# Patient Record
Sex: FEMALE | Race: WHITE | NOT HISPANIC OR LATINO
[De-identification: names, ages, dates, MRNs, and addresses within clinical notes are randomized per-mention and may not be internally consistent; named-entity substitution may affect disease eponyms.]

---

## 2022-04-07 PROBLEM — Z00.00 ENCOUNTER FOR PREVENTIVE HEALTH EXAMINATION: Status: ACTIVE | Noted: 2022-04-07

## 2022-05-10 ENCOUNTER — APPOINTMENT (OUTPATIENT)
Dept: ORTHOPEDIC SURGERY | Facility: CLINIC | Age: 82
End: 2022-05-10
Payer: COMMERCIAL

## 2022-05-10 VITALS — HEIGHT: 65 IN | BODY MASS INDEX: 35.32 KG/M2 | WEIGHT: 212 LBS

## 2022-05-10 DIAGNOSIS — Z78.9 OTHER SPECIFIED HEALTH STATUS: ICD-10-CM

## 2022-05-10 DIAGNOSIS — Z86.79 PERSONAL HISTORY OF OTHER DISEASES OF THE CIRCULATORY SYSTEM: ICD-10-CM

## 2022-05-10 DIAGNOSIS — Z86.39 PERSONAL HISTORY OF OTHER ENDOCRINE, NUTRITIONAL AND METABOLIC DISEASE: ICD-10-CM

## 2022-05-10 PROCEDURE — 99214 OFFICE O/P EST MOD 30 MIN: CPT

## 2022-05-10 RX ORDER — FUROSEMIDE 20 MG/1
20 TABLET ORAL
Refills: 0 | Status: ACTIVE | COMMUNITY

## 2022-05-10 RX ORDER — METOPROLOL SUCCINATE 200 MG/1
TABLET, EXTENDED RELEASE ORAL
Refills: 0 | Status: ACTIVE | COMMUNITY

## 2022-05-10 RX ORDER — RIVAROXABAN 20 MG/1
20 TABLET, FILM COATED ORAL
Refills: 0 | Status: ACTIVE | COMMUNITY

## 2022-05-10 RX ORDER — ROSUVASTATIN CALCIUM 20 MG/1
20 TABLET, FILM COATED ORAL
Refills: 0 | Status: ACTIVE | COMMUNITY

## 2022-05-10 NOTE — PHYSICAL EXAM
[Bilateral] : knee bilaterally [NL (0)] : extension 0 degrees [] : ambulation with cane [de-identified] : PVD, mild pitting edema both legs [TWNoteComboBox7] : flexion 135 degrees

## 2022-05-10 NOTE — HISTORY OF PRESENT ILLNESS
[4] : 4 [Dull/Aching] : dull/aching [Intermittent] : intermittent [Leisure] : leisure [Physical therapy] : physical therapy [Stairs] : stairs [Retired] : Work status: retired [] : Post Surgical Visit: no [FreeTextEntry1] : B Knees  [de-identified] : 3/8/2022 [de-identified] : Dr. Angelo

## 2022-05-10 NOTE — REASON FOR VISIT
[Spouse] : spouse [FreeTextEntry2] : Patient complains of continued soreness R Knee. Improvement L Knee . Patient would like to repeat HA injections both knees. Goes to PT.

## 2022-05-31 ENCOUNTER — APPOINTMENT (OUTPATIENT)
Dept: ORTHOPEDIC SURGERY | Facility: CLINIC | Age: 82
End: 2022-05-31

## 2022-06-14 ENCOUNTER — APPOINTMENT (OUTPATIENT)
Dept: ORTHOPEDIC SURGERY | Facility: CLINIC | Age: 82
End: 2022-06-14
Payer: COMMERCIAL

## 2022-06-14 VITALS — WEIGHT: 212 LBS | HEIGHT: 65 IN | BODY MASS INDEX: 35.32 KG/M2

## 2022-06-14 PROCEDURE — 20611 DRAIN/INJ JOINT/BURSA W/US: CPT | Mod: 50

## 2022-06-14 RX ORDER — HYALURONATE SODIUM 10 MG/ML
20 VIAL (ML) INTRAARTICULAR
Refills: 0 | Status: COMPLETED | OUTPATIENT
Start: 2022-06-14

## 2022-06-14 RX ORDER — LOSARTAN POTASSIUM 50 MG/1
50 TABLET, FILM COATED ORAL
Qty: 90 | Refills: 0 | Status: ACTIVE | COMMUNITY
Start: 2022-04-11

## 2022-06-14 RX ORDER — HYALURONATE SODIUM 10 MG/ML
20 VIAL (ML) INTRAARTICULAR
Qty: 20 | Refills: 0 | Status: ACTIVE | COMMUNITY
Start: 2022-05-17

## 2022-06-14 NOTE — PHYSICAL EXAM
[Bilateral] : knee bilaterally [NL (0)] : extension 0 degrees [] : ambulation with cane [de-identified] : PVD, mild pitting edema both legs [TWNoteComboBox7] : flexion 135 degrees

## 2022-06-14 NOTE — PROCEDURE
[Large Joint Injection] : Large joint injection [Bilateral] : bilaterally of the [Knee] : knee [Pain] : pain [Inflammation] : inflammation [X-ray evidence of Osteoarthritis on this or prior visit] : x-ray evidence of Osteoarthritis on this or prior visit [Repeat series performed] : repeat series performed [Betadine] : betadine [Ethyl Chloride sprayed topically] : ethyl chloride sprayed topically [Sterile technique used] : sterile technique used [Other: ____] : [unfilled] [#1] : series #1 [] : Patient tolerated procedure well [Apply ice for 15min out of every hour for the next 12-24 hours as tolerated] : apply ice for 15 minutes out of every hour for the next 12-24 hours as tolerated [Patient was advised to rest the joint(s) for ____ days] : patient was advised to rest the joint(s) for [unfilled] days [Risks, benefits, alternatives discussed / Verbal consent obtained] : the risks benefits, and alternatives have been discussed, and verbal consent was obtained [Prior failure or difficult injection] : prior failure or difficult injection [All ultrasound images have been permanently captured and stored accordingly in our picture archiving and communication system] : All ultrasound images have been permanently captured and stored accordingly in our picture archiving and communication system [Visualization of the needle and placement of injection was performed without complication] : visualization of the needle and placement of injection was performed without complication

## 2022-06-14 NOTE — ASSESSMENT
[FreeTextEntry1] : recommend rest and apply ice to the knee today\par gave her a note to get [employer] for both legs for lymphedema.

## 2022-06-14 NOTE — HISTORY OF PRESENT ILLNESS
[Gradual] : gradual [4] : 4 [Intermittent] : intermittent [Household chores] : household chores [Leisure] : leisure [Rest] : rest [Injection therapy] : injection therapy [Stairs] : stairs [Retired] : Work status: retired [] : Post Surgical Visit: no

## 2022-06-21 ENCOUNTER — APPOINTMENT (OUTPATIENT)
Dept: ORTHOPEDIC SURGERY | Facility: CLINIC | Age: 82
End: 2022-06-21
Payer: COMMERCIAL

## 2022-06-21 VITALS — WEIGHT: 212 LBS | HEIGHT: 65 IN | BODY MASS INDEX: 35.32 KG/M2

## 2022-06-21 PROCEDURE — 20611 DRAIN/INJ JOINT/BURSA W/US: CPT | Mod: 50

## 2022-06-21 RX ORDER — HYALURONATE SODIUM 10 MG/ML
20 VIAL (ML) INTRAARTICULAR
Refills: 0 | Status: COMPLETED | OUTPATIENT
Start: 2022-06-21

## 2022-06-21 NOTE — PROCEDURE
[Large Joint Injection] : Large joint injection [Bilateral] : bilaterally of the [Knee] : knee [Pain] : pain [X-ray evidence of Osteoarthritis on this or prior visit] : x-ray evidence of Osteoarthritis on this or prior visit [Repeat series performed] : repeat series performed [Betadine] : betadine [Ethyl Chloride sprayed topically] : ethyl chloride sprayed topically [Sterile technique used] : sterile technique used [Other: ____] : [unfilled] [#2] : series #2 [] : Patient tolerated procedure well [Apply ice for 15min out of every hour for the next 12-24 hours as tolerated] : apply ice for 15 minutes out of every hour for the next 12-24 hours as tolerated [Patient was advised to rest the joint(s) for ____ days] : patient was advised to rest the joint(s) for [unfilled] days [Risks, benefits, alternatives discussed / Verbal consent obtained] : the risks benefits, and alternatives have been discussed, and verbal consent was obtained [Prior failure or difficult injection] : prior failure or difficult injection [All ultrasound images have been permanently captured and stored accordingly in our picture archiving and communication system] : All ultrasound images have been permanently captured and stored accordingly in our picture archiving and communication system [Visualization of the needle and placement of injection was performed without complication] : visualization of the needle and placement of injection was performed without complication

## 2022-06-21 NOTE — HISTORY OF PRESENT ILLNESS
[Gradual] : gradual [3] : 3 [Dull/Aching] : dull/aching [Intermittent] : intermittent [Household chores] : household chores [Leisure] : leisure [Rest] : rest [Injection therapy] : injection therapy [Stairs] : stairs [Retired] : Work status: retired [2] : 2 [Hyalgan] : Hyalgan [] : Post Surgical Visit: no [de-identified] : 6/14/2022

## 2022-06-21 NOTE — PHYSICAL EXAM
[Bilateral] : knee bilaterally [NL (0)] : extension 0 degrees [] : ambulation with cane [de-identified] : PVD, mild pitting edema both legs [TWNoteComboBox7] : flexion 135 degrees

## 2022-06-28 ENCOUNTER — APPOINTMENT (OUTPATIENT)
Dept: ORTHOPEDIC SURGERY | Facility: CLINIC | Age: 82
End: 2022-06-28
Payer: COMMERCIAL

## 2022-06-28 VITALS — HEIGHT: 65 IN | WEIGHT: 212 LBS | BODY MASS INDEX: 35.32 KG/M2

## 2022-06-28 PROCEDURE — 20611 DRAIN/INJ JOINT/BURSA W/US: CPT | Mod: 50

## 2022-06-28 RX ORDER — HYALURONATE SODIUM 10 MG/ML
20 VIAL (ML) INTRAARTICULAR
Refills: 0 | Status: COMPLETED | OUTPATIENT
Start: 2022-06-28

## 2022-06-28 NOTE — PROCEDURE
[Large Joint Injection] : Large joint injection [Bilateral] : bilaterally of the [Knee] : knee [Pain] : pain [Inflammation] : inflammation [X-ray evidence of Osteoarthritis on this or prior visit] : x-ray evidence of Osteoarthritis on this or prior visit [Repeat series performed] : repeat series performed [Betadine] : betadine [Ethyl Chloride sprayed topically] : ethyl chloride sprayed topically [Sterile technique used] : sterile technique used [Other: ____] : [unfilled] [#3] : series #3 [Apply ice for 15min out of every hour for the next 12-24 hours as tolerated] : apply ice for 15 minutes out of every hour for the next 12-24 hours as tolerated [Patient was advised to rest the joint(s) for ____ days] : patient was advised to rest the joint(s) for [unfilled] days [Prior failure or difficult injection] : prior failure or difficult injection [All ultrasound images have been permanently captured and stored accordingly in our picture archiving and communication system] : All ultrasound images have been permanently captured and stored accordingly in our picture archiving and communication system [Visualization of the needle and placement of injection was performed without complication] : visualization of the needle and placement of injection was performed without complication

## 2022-06-28 NOTE — PHYSICAL EXAM
[Bilateral] : knee bilaterally [NL (0)] : extension 0 degrees [] : ambulation with cane [de-identified] : PVD, mild pitting edema both legs [TWNoteComboBox7] : flexion 135 degrees

## 2022-06-28 NOTE — HISTORY OF PRESENT ILLNESS
[Gradual] : gradual [4] : 4 [Dull/Aching] : dull/aching [Intermittent] : intermittent [Leisure] : leisure [Rest] : rest [Injection therapy] : injection therapy [Walking] : walking [Stairs] : stairs [Retired] : Work status: retired [3] : 3 [Hyalgan] : Hyalgan [] : Post Surgical Visit: no [de-identified] : 6/21/2022

## 2022-07-05 ENCOUNTER — APPOINTMENT (OUTPATIENT)
Dept: ORTHOPEDIC SURGERY | Facility: CLINIC | Age: 82
End: 2022-07-05

## 2022-07-05 VITALS — HEIGHT: 65 IN | WEIGHT: 212 LBS | BODY MASS INDEX: 35.32 KG/M2

## 2022-07-05 PROCEDURE — 20611 DRAIN/INJ JOINT/BURSA W/US: CPT | Mod: 50

## 2022-07-05 RX ORDER — HYALURONATE SODIUM 10 MG/ML
20 VIAL (ML) INTRAARTICULAR
Refills: 0 | Status: COMPLETED | OUTPATIENT
Start: 2022-07-05

## 2022-07-05 NOTE — PROCEDURE
[Large Joint Injection] : Large joint injection [Bilateral] : bilaterally of the [Knee] : knee [Pain] : pain [X-ray evidence of Osteoarthritis on this or prior visit] : x-ray evidence of Osteoarthritis on this or prior visit [Repeat series performed] : repeat series performed [Betadine] : betadine [Ethyl Chloride sprayed topically] : ethyl chloride sprayed topically [Sterile technique used] : sterile technique used [Other: ____] : [unfilled] [#4] : series #4 [Apply ice for 15min out of every hour for the next 12-24 hours as tolerated] : apply ice for 15 minutes out of every hour for the next 12-24 hours as tolerated [Patient was advised to rest the joint(s) for ____ days] : patient was advised to rest the joint(s) for [unfilled] days [Risks, benefits, alternatives discussed / Verbal consent obtained] : the risks benefits, and alternatives have been discussed, and verbal consent was obtained [Prior failure or difficult injection] : prior failure or difficult injection [All ultrasound images have been permanently captured and stored accordingly in our picture archiving and communication system] : All ultrasound images have been permanently captured and stored accordingly in our picture archiving and communication system [Visualization of the needle and placement of injection was performed without complication] : visualization of the needle and placement of injection was performed without complication

## 2022-07-06 NOTE — PHYSICAL EXAM
[Bilateral] : knee bilaterally [NL (0)] : extension 0 degrees [] : ambulation with cane [de-identified] : PVD, mild pitting edema both legs [TWNoteComboBox7] : flexion 135 degrees

## 2022-07-06 NOTE — HISTORY OF PRESENT ILLNESS
[Dull/Aching] : dull/aching [Intermittent] : intermittent [Household chores] : household chores [Leisure] : leisure [Rest] : rest [Injection therapy] : injection therapy [Stairs] : stairs [Retired] : Work status: retired [4] : 4 [Hyalgan] : Hyalgan [] : Post Surgical Visit: no [de-identified] : 6/28/2022

## 2022-07-12 ENCOUNTER — APPOINTMENT (OUTPATIENT)
Dept: ORTHOPEDIC SURGERY | Facility: CLINIC | Age: 82
End: 2022-07-12

## 2022-07-12 VITALS — WEIGHT: 212 LBS | HEIGHT: 65 IN | BODY MASS INDEX: 35.32 KG/M2

## 2022-07-12 PROCEDURE — 20611 DRAIN/INJ JOINT/BURSA W/US: CPT | Mod: 50

## 2022-07-12 RX ORDER — HYALURONATE SODIUM 10 MG/ML
20 VIAL (ML) INTRAARTICULAR
Refills: 0 | Status: COMPLETED | OUTPATIENT
Start: 2022-07-12

## 2022-07-12 NOTE — PROCEDURE
[Large Joint Injection] : Large joint injection [Bilateral] : bilaterally of the [Knee] : knee [Pain] : pain [X-ray evidence of Osteoarthritis on this or prior visit] : x-ray evidence of Osteoarthritis on this or prior visit [Repeat series performed] : repeat series performed [Betadine] : betadine [Ethyl Chloride sprayed topically] : ethyl chloride sprayed topically [Sterile technique used] : sterile technique used [Other: ____] : [unfilled] [#5] : series #5 [] : Patient tolerated procedure well [Apply ice for 15min out of every hour for the next 12-24 hours as tolerated] : apply ice for 15 minutes out of every hour for the next 12-24 hours as tolerated [Patient was advised to rest the joint(s) for ____ days] : patient was advised to rest the joint(s) for [unfilled] days [Risks, benefits, alternatives discussed / Verbal consent obtained] : the risks benefits, and alternatives have been discussed, and verbal consent was obtained [Prior failure or difficult injection] : prior failure or difficult injection [All ultrasound images have been permanently captured and stored accordingly in our picture archiving and communication system] : All ultrasound images have been permanently captured and stored accordingly in our picture archiving and communication system [Visualization of the needle and placement of injection was performed without complication] : visualization of the needle and placement of injection was performed without complication

## 2022-07-12 NOTE — HISTORY OF PRESENT ILLNESS
[Gradual] : gradual [Dull/Aching] : dull/aching [Intermittent] : intermittent [Rest] : rest [Stairs] : stairs [5] : 5 [Hyalgan] : Hyalgan [] : Post Surgical Visit: no [de-identified] : 7/5/2022

## 2022-07-12 NOTE — PHYSICAL EXAM
[Bilateral] : knee bilaterally [NL (0)] : extension 0 degrees [] : ambulation with cane [de-identified] : PVD, mild pitting edema both legs [TWNoteComboBox7] : flexion 135 degrees

## 2022-08-22 ENCOUNTER — APPOINTMENT (OUTPATIENT)
Dept: ORTHOPEDIC SURGERY | Facility: CLINIC | Age: 82
End: 2022-08-22

## 2022-08-22 VITALS — BODY MASS INDEX: 35.32 KG/M2 | HEIGHT: 65 IN | WEIGHT: 212 LBS

## 2022-08-22 PROCEDURE — 73502 X-RAY EXAM HIP UNI 2-3 VIEWS: CPT | Mod: LT

## 2022-08-22 PROCEDURE — 20610 DRAIN/INJ JOINT/BURSA W/O US: CPT | Mod: LT

## 2022-08-22 PROCEDURE — 99213 OFFICE O/P EST LOW 20 MIN: CPT | Mod: 25

## 2022-08-22 RX ORDER — METHYLPREDNISOLONE ACETATE 40 MG/ML
40 INJECTION, SUSPENSION INTRA-ARTICULAR; INTRALESIONAL; INTRAMUSCULAR; SOFT TISSUE
Qty: 1 | Refills: 0 | Status: COMPLETED | OUTPATIENT
Start: 2022-08-22

## 2022-08-22 NOTE — PHYSICAL EXAM
[Left] : left hip [] : ambulation with crutches [AP] : anteroposterior [Lateral] : lateral [There are no fractures, subluxations or dislocations. No significant abnormalities are seen] : There are no fractures, subluxations or dislocations. No significant abnormalities are seen [Moderate arthritis (Tonnis Grade 2)] : Moderate arthritis (Tonnis Grade 2)

## 2022-08-22 NOTE — HISTORY OF PRESENT ILLNESS
[Gradual] : gradual [8] : 8 [2] : 2 [Dull/Aching] : dull/aching [Sharp] : sharp [Tightness] : tightness [Frequent] : frequent [Household chores] : household chores [Leisure] : leisure [Sleep] : sleep [Rest] : rest [Ice] : ice [Heat] : heat [Sitting] : sitting [Standing] : standing [Walking] : walking [Retired] : Work status: retired [de-identified] : Left hip pain [] : no [FreeTextEntry1] : left hip

## 2022-08-22 NOTE — PROCEDURE
[Large Joint Injection] : Large joint injection [Left] : of the left [Greater Trochanteric Bursa] : greater trochanteric bursa [Pain] : pain [Betadine] : betadine [Ethyl Chloride sprayed topically] : ethyl chloride sprayed topically [Sterile technique used] : sterile technique used [___ cc    1%] : Lidocaine ~Vcc of 1%  [___ cc    40mg] : Methylprednisolone (Depomedrol) ~Vcc of 40 mg  [] : Patient tolerated procedure well [Apply ice for 15min out of every hour for the next 12-24 hours as tolerated] : apply ice for 15 minutes out of every hour for the next 12-24 hours as tolerated [Patient was advised to rest the joint(s) for ____ days] : patient was advised to rest the joint(s) for [unfilled] days [Risks, benefits, alternatives discussed / Verbal consent obtained] : the risks benefits, and alternatives have been discussed, and verbal consent was obtained

## 2022-08-22 NOTE — REASON FOR VISIT
[Spouse] : spouse [FreeTextEntry2] : Patient c/o left hip pain for about one week. No injury or trauma. Has sharp pain that radiates down leg to the knee.Applied ice and heat.\par Difficulty with sleeping. Is using a cane and a crutch, cannot take NSAIDs. Has defib/ PM and on anticoagulants.

## 2022-09-12 ENCOUNTER — APPOINTMENT (OUTPATIENT)
Dept: ORTHOPEDIC SURGERY | Facility: CLINIC | Age: 82
End: 2022-09-12

## 2022-09-12 VITALS — HEIGHT: 65 IN | BODY MASS INDEX: 35.32 KG/M2 | WEIGHT: 212 LBS

## 2022-09-12 PROCEDURE — 99213 OFFICE O/P EST LOW 20 MIN: CPT

## 2022-09-12 NOTE — REASON FOR VISIT
[FreeTextEntry2] : Patient complains of continued stiffness and discomfort in the R Knee. Patient feels improvement in the L Knee. Patient states that R Knee discomfort increases with walking. Patent has been taking Tylenol which has been helpful. Uses a crutch and cane. History of lymphedema in both legs, treated.

## 2022-09-12 NOTE — HISTORY OF PRESENT ILLNESS
[Gradual] : gradual [8] : 8 [1] : 2 [Dull/Aching] : dull/aching [Intermittent] : intermittent [Leisure] : leisure [Rest] : rest [Meds] : meds [Walking] : walking [Retired] : Work status: retired [] : Post Surgical Visit: no [FreeTextEntry7] : R Calf [FreeTextEntry9] : Tylenol [de-identified] : 07/12/22 [de-identified] : Dr. Angelo [de-identified] : 07/12/22

## 2022-09-12 NOTE — PHYSICAL EXAM
[NL (0)] : extension 0 degrees [Bilateral] : foot and ankle bilaterally [] : swelling [Mild] : mild swelling of calf [de-identified] : PVD, mild pitting edema both legs [TWNoteComboBox7] : flexion 135 degrees

## 2022-09-16 ENCOUNTER — NON-APPOINTMENT (OUTPATIENT)
Age: 82
End: 2022-09-16

## 2022-10-19 ENCOUNTER — APPOINTMENT (OUTPATIENT)
Dept: ORTHOPEDIC SURGERY | Facility: CLINIC | Age: 82
End: 2022-10-19

## 2022-10-19 VITALS — BODY MASS INDEX: 35.32 KG/M2 | WEIGHT: 212 LBS | HEIGHT: 65 IN

## 2022-10-19 PROCEDURE — 99214 OFFICE O/P EST MOD 30 MIN: CPT

## 2022-10-19 NOTE — ASSESSMENT
[FreeTextEntry1] : describes radicular pain left leg. Will get CT scan of the lumbar spine, she has a pacemaker. May try HEP and voltaren gel.

## 2022-10-19 NOTE — PHYSICAL EXAM
[Left] : left hip [Bilateral] : knee bilaterally [NL (0)] : extension 0 degrees [] : antalgic [de-identified] : PVD, mild pitting edema both legs [TWNoteComboBox7] : flexion 135 degrees no fever and no chills.

## 2022-10-19 NOTE — HISTORY OF PRESENT ILLNESS
[Gradual] : gradual [3] : 3 [Dull/Aching] : dull/aching [Intermittent] : intermittent [Rest] : rest [Physical therapy] : physical therapy [Retired] : Work status: retired [] : Post Surgical Visit: no [de-identified] : 09/12/22 [de-identified] : Dr. Angelo [de-identified] : 10/11/22

## 2022-10-19 NOTE — REASON FOR VISIT
[Spouse] : spouse [FreeTextEntry2] : Patient feels some improvement in Both Knees since her last visit. Patient states that she goes to PT 1x a week which has been helpful. Patient states that she has been having some swelling in Both Legs which recently increased. Patient also has some L Hip pain which she would like to discuss. Has been doing some exercises at home. On Xarelto.

## 2022-10-24 ENCOUNTER — RESULT REVIEW (OUTPATIENT)
Age: 82
End: 2022-10-24

## 2022-11-07 ENCOUNTER — APPOINTMENT (OUTPATIENT)
Dept: ORTHOPEDIC SURGERY | Facility: CLINIC | Age: 82
End: 2022-11-07

## 2022-11-07 VITALS — WEIGHT: 212 LBS | HEIGHT: 65 IN | BODY MASS INDEX: 35.32 KG/M2

## 2022-11-07 PROCEDURE — 99214 OFFICE O/P EST MOD 30 MIN: CPT

## 2022-11-07 NOTE — HISTORY OF PRESENT ILLNESS
[Gradual] : gradual [5] : 5 [Dull/Aching] : dull/aching [Intermittent] : intermittent [Stairs] : stairs [Retired] : Work status: retired [] : Post Surgical Visit: no [de-identified] : CT scan

## 2022-11-07 NOTE — REASON FOR VISIT
[FreeTextEntry2] : Patient complains of continued L Hip pain since her last visit. Patient states that she started taking Tramadol (prescribed by her cardiologist) which has helped a little bit. Patient states that she went to [email or web address] in [place] and had her legs wrapped which has helped decrease swelling. Patient has been doing Home Exercises which has been helpful. Patient would like to discuss CT scan report: multiple DDD, stenosis and spondylolisthesis. Getting cardioversion tomorrow.

## 2022-12-23 ENCOUNTER — APPOINTMENT (OUTPATIENT)
Dept: ORTHOPEDIC SURGERY | Facility: CLINIC | Age: 82
End: 2022-12-23

## 2022-12-23 VITALS — BODY MASS INDEX: 35.32 KG/M2 | WEIGHT: 212 LBS | HEIGHT: 65 IN

## 2022-12-23 PROCEDURE — 99213 OFFICE O/P EST LOW 20 MIN: CPT

## 2022-12-23 NOTE — ASSESSMENT
[FreeTextEntry1] : while in exam room, phone call to her cardiologist and permission given for MDP as she is on Xarelto.

## 2022-12-23 NOTE — HISTORY OF PRESENT ILLNESS
[Lower back] : lower back [Left Leg] : left leg [Gradual] : gradual [9] : 9 [1] : 2 [Radiating] : radiating [Sharp] : sharp [Intermittent] : intermittent [Household chores] : household chores [Leisure] : leisure [Rest] : rest [Stairs] : stairs [Retired] : Work status: retired [] : Post Surgical Visit: no [FreeTextEntry7] : L Leg  [de-identified] : 11/7/2022 [de-identified] : Dr. Angelo

## 2022-12-23 NOTE — REASON FOR VISIT
[Spouse] : spouse [Family Member] : family member [FreeTextEntry2] : Patient complains of continued  L Back pain radiating down L Leg. Accompanied by spouse and daughter Courtney. Sitting in wheelchair today due to inability to walk long distances.

## 2022-12-28 ENCOUNTER — NON-APPOINTMENT (OUTPATIENT)
Age: 82
End: 2022-12-28

## 2023-01-10 ENCOUNTER — APPOINTMENT (OUTPATIENT)
Dept: ORTHOPEDIC SURGERY | Facility: CLINIC | Age: 83
End: 2023-01-10
Payer: COMMERCIAL

## 2023-01-10 VITALS — WEIGHT: 212 LBS | BODY MASS INDEX: 35.32 KG/M2 | HEIGHT: 65 IN

## 2023-01-10 PROCEDURE — 99213 OFFICE O/P EST LOW 20 MIN: CPT

## 2023-01-10 RX ORDER — METHYLPREDNISOLONE 4 MG/1
4 TABLET ORAL
Qty: 21 | Refills: 0 | Status: COMPLETED | COMMUNITY
Start: 2022-12-23 | End: 2023-01-10

## 2023-01-10 NOTE — HISTORY OF PRESENT ILLNESS
[Lower back] : lower back [Left Leg] : left leg [Gradual] : gradual [8] : 8 [0] : 0 [Radiating] : radiating [Sharp] : sharp [Intermittent] : intermittent [Leisure] : leisure [Rest] : rest [Walking] : walking [Stairs] : stairs [Retired] : Work status: retired [] : Post Surgical Visit: no [FreeTextEntry7] : L Leg  [de-identified] : 12/23/2022 [de-identified] : Dr. Angelo

## 2023-01-10 NOTE — REASON FOR VISIT
[Spouse] : spouse [FreeTextEntry2] : Patient complains of continued L Back pain. MDP only gave her a few days relief. Difficulty with stairs.Pain radiating down L Leg. Accompanied by spouse

## 2023-01-10 NOTE — PHYSICAL EXAM
[Left lower extremity below knee] : left lower extremity below knee [] : negative sitting straight leg raise [FreeTextEntry3] : sitting in wheelchair, difficult to get up.  [de-identified] : severe lymphedema both legs

## 2023-01-10 NOTE — ASSESSMENT
[FreeTextEntry1] : unable to take NSAIDs due to cardiac condition. Will try PT, if no better discussed PM for injections. Wants o try another MDP, will check with her cardiologist prior to taking it.

## 2023-01-24 ENCOUNTER — APPOINTMENT (OUTPATIENT)
Dept: ORTHOPEDIC SURGERY | Facility: CLINIC | Age: 83
End: 2023-01-24
Payer: COMMERCIAL

## 2023-01-24 VITALS — WEIGHT: 212 LBS | BODY MASS INDEX: 35.32 KG/M2 | HEIGHT: 65 IN

## 2023-01-24 PROCEDURE — 99213 OFFICE O/P EST LOW 20 MIN: CPT

## 2023-01-24 RX ORDER — METHYLPREDNISOLONE 4 MG/1
4 TABLET ORAL
Qty: 21 | Refills: 0 | Status: COMPLETED | COMMUNITY
Start: 2023-01-10 | End: 2023-01-24

## 2023-01-24 NOTE — HISTORY OF PRESENT ILLNESS
[Lower back] : lower back [Gradual] : gradual [7] : 7 Abdominal Pain, N/V/D [3] : 3 [Dull/Aching] : dull/aching [Radiating] : radiating [Intermittent] : intermittent [Household chores] : household chores [Rest] : rest [Meds] : meds [Physical therapy] : physical therapy [Walking] : walking [Retired] : Work status: retired [] : Post Surgical Visit: no [FreeTextEntry7] : L Leg  [de-identified] : 1/10/2023 [de-identified] : Dr. Angelo  [de-identified] : 1/21/2023 [de-identified] : CT

## 2023-01-24 NOTE — PHYSICAL EXAM
[] : negative sitting straight leg raise [de-identified] : lymphedema both legs. Able to heel and toe walk.

## 2023-01-24 NOTE — ASSESSMENT
[FreeTextEntry1] : feeling better with the MDP; will continue with PT. Wants to hold on PM for now.

## 2023-01-24 NOTE — REASON FOR VISIT
[Spouse] : spouse [FreeTextEntry2] : Patient was feeling improvement after Medrol Dose igor. Patient has also started PT which has been helping. Decreased pain L Back. She has not started pain management yet. Accompanied by spouse

## 2023-03-07 ENCOUNTER — APPOINTMENT (OUTPATIENT)
Dept: ORTHOPEDIC SURGERY | Facility: CLINIC | Age: 83
End: 2023-03-07
Payer: COMMERCIAL

## 2023-03-07 VITALS — BODY MASS INDEX: 35.32 KG/M2 | HEIGHT: 65 IN | WEIGHT: 212 LBS

## 2023-03-07 DIAGNOSIS — M70.62 TROCHANTERIC BURSITIS, LEFT HIP: ICD-10-CM

## 2023-03-07 PROCEDURE — 99214 OFFICE O/P EST MOD 30 MIN: CPT

## 2023-03-07 NOTE — REASON FOR VISIT
[Spouse] : spouse [FreeTextEntry2] : Patient feels some improvement since last visit. She continues with PT twice a week which has been helpful. Accompanied by spouse.

## 2023-03-07 NOTE — HISTORY OF PRESENT ILLNESS
[Lower back] : lower back [Left Leg] : left leg [Gradual] : gradual [5] : 5 [Dull/Aching] : dull/aching [Intermittent] : intermittent [Household chores] : household chores [Leisure] : leisure [Physical therapy] : physical therapy [Retired] : Work status: retired [] : Post Surgical Visit: no [FreeTextEntry7] : L Leg  [de-identified] : 1/24/2023 [de-identified] : Dr. Angelo  [de-identified] : 2/2/2023 [de-identified] : CT L SPINE

## 2023-03-07 NOTE — PHYSICAL EXAM
[] : negative sitting straight leg raise [de-identified] : lymphedema both legs. Able to heel and toe walk.

## 2023-04-18 ENCOUNTER — APPOINTMENT (OUTPATIENT)
Dept: ORTHOPEDIC SURGERY | Facility: CLINIC | Age: 83
End: 2023-04-18
Payer: COMMERCIAL

## 2023-04-18 VITALS — BODY MASS INDEX: 35.32 KG/M2 | WEIGHT: 212 LBS | HEIGHT: 65 IN

## 2023-04-18 PROCEDURE — 99213 OFFICE O/P EST LOW 20 MIN: CPT

## 2023-04-18 NOTE — PHYSICAL EXAM
[de-identified] : lymphedema both legs. Able to heel and toe walk.  [Bilateral] : knee bilaterally [NL (0)] : extension 0 degrees Ejection Fraction(%) [] : ambulation with cane [de-identified] : PVD, mild pitting edema both legs [TWNoteComboBox7] : flexion 135 degrees

## 2023-04-18 NOTE — REASON FOR VISIT
[Spouse] : spouse [FreeTextEntry2] : Patient feels improvement in her Lower Back since her last visit. Patient has been going to PT 2x a week which has been helpful. Some radiating pain in the R Leg.

## 2023-04-18 NOTE — HISTORY OF PRESENT ILLNESS
[Lower back] : lower back [Gradual] : gradual [1] : 2 [Dull/Aching] : dull/aching [Intermittent] : intermittent [Retired] : Work status: retired [] : Post Surgical Visit: no [FreeTextEntry7] : R Leg [de-identified] : 03/07/23 [de-identified] : DR. Angelo [de-identified] : 04/13/23

## 2023-05-30 ENCOUNTER — APPOINTMENT (OUTPATIENT)
Dept: ORTHOPEDIC SURGERY | Facility: CLINIC | Age: 83
End: 2023-05-30
Payer: COMMERCIAL

## 2023-05-30 VITALS — HEIGHT: 65 IN | WEIGHT: 212 LBS | BODY MASS INDEX: 35.32 KG/M2

## 2023-05-30 PROCEDURE — 99213 OFFICE O/P EST LOW 20 MIN: CPT

## 2023-05-30 RX ORDER — TRAMADOL HYDROCHLORIDE 50 MG/1
50 TABLET, COATED ORAL
Qty: 15 | Refills: 0 | Status: COMPLETED | COMMUNITY
Start: 2022-11-04 | End: 2023-05-30

## 2023-05-30 RX ORDER — SOTALOL HYDROCHLORIDE 80 MG/1
80 TABLET ORAL
Refills: 0 | Status: COMPLETED | COMMUNITY
End: 2023-05-30

## 2023-05-30 RX ORDER — AMIODARONE HYDROCHLORIDE 200 MG/1
200 TABLET ORAL
Qty: 100 | Refills: 0 | Status: ACTIVE | COMMUNITY
Start: 2023-05-03

## 2023-05-30 NOTE — HISTORY OF PRESENT ILLNESS
[Lower back] : lower back [Gradual] : gradual [4] : 4 [Dull/Aching] : dull/aching [Intermittent] : intermittent [Leisure] : leisure [Walking] : walking [Retired] : Work status: retired [] : Post Surgical Visit: no [de-identified] : 4/18/2023 [de-identified] : Dr. Angelo

## 2023-05-30 NOTE — PHYSICAL EXAM
[de-identified] : lymphedema both legs. Able to heel and toe walk.  [Bilateral] : knee bilaterally [NL (0)] : extension 0 degrees [] : ambulation with cane [de-identified] : PVD, mild pitting edema both legs [TWNoteComboBox7] : flexion 135 degrees

## 2023-05-30 NOTE — REASON FOR VISIT
[Spouse] : spouse [FreeTextEntry2] : Patient feels improvement since last visit. Decreased pain L Back. Patient would like a new prescription for PT Accompanied by spouse

## 2023-07-12 ENCOUNTER — APPOINTMENT (OUTPATIENT)
Dept: ORTHOPEDIC SURGERY | Facility: CLINIC | Age: 83
End: 2023-07-12
Payer: COMMERCIAL

## 2023-07-12 VITALS — WEIGHT: 212 LBS | BODY MASS INDEX: 35.32 KG/M2 | HEIGHT: 65 IN

## 2023-07-12 PROCEDURE — 99213 OFFICE O/P EST LOW 20 MIN: CPT

## 2023-07-12 RX ORDER — PANTOPRAZOLE 40 MG/1
40 TABLET, DELAYED RELEASE ORAL
Qty: 45 | Refills: 0 | Status: COMPLETED | COMMUNITY
Start: 2023-05-03 | End: 2023-07-12

## 2023-07-12 NOTE — PHYSICAL EXAM
[Bilateral] : knee bilaterally [NL (0)] : extension 0 degrees [] : ambulation with cane [de-identified] : PVD, mild pitting edema both legs [TWNoteComboBox7] : flexion 135 degrees

## 2023-07-12 NOTE — REASON FOR VISIT
[Spouse] : spouse [FreeTextEntry2] : Patient feels improvement since last. Decreased pain L Leg. PT has been helpful.She would like a new prescription for PT. She would also like to know if its possible to get a prescription for an scooter.

## 2023-07-12 NOTE — HISTORY OF PRESENT ILLNESS
[Lower back] : lower back [Right Leg] : right leg [Gradual] : gradual [4] : 4 [0] : 0 [Radiating] : radiating [Intermittent] : intermittent [Leisure] : leisure [Rest] : rest [Physical therapy] : physical therapy [Walking] : walking [Retired] : Work status: retired [] : Post Surgical Visit: no [FreeTextEntry7] : R Leg  [de-identified] : 5/30/2023 [de-identified] : Dr. Angelo  [de-identified] : M

## 2023-10-18 ENCOUNTER — APPOINTMENT (OUTPATIENT)
Dept: ORTHOPEDIC SURGERY | Facility: CLINIC | Age: 83
End: 2023-10-18
Payer: COMMERCIAL

## 2023-10-18 DIAGNOSIS — M79.89 OTHER SPECIFIED SOFT TISSUE DISORDERS: ICD-10-CM

## 2023-10-18 PROCEDURE — 99214 OFFICE O/P EST MOD 30 MIN: CPT | Mod: 25

## 2023-10-18 PROCEDURE — 20610 DRAIN/INJ JOINT/BURSA W/O US: CPT | Mod: LT

## 2023-10-18 RX ORDER — METHYLPREDNISOLONE ACETATE 40 MG/ML
40 INJECTION, SUSPENSION INTRA-ARTICULAR; INTRALESIONAL; INTRAMUSCULAR; SOFT TISSUE
Qty: 1 | Refills: 0 | Status: COMPLETED | OUTPATIENT
Start: 2023-10-18

## 2023-10-25 ENCOUNTER — APPOINTMENT (OUTPATIENT)
Dept: ORTHOPEDIC SURGERY | Facility: CLINIC | Age: 83
End: 2023-10-25

## 2023-11-21 ENCOUNTER — APPOINTMENT (OUTPATIENT)
Dept: ORTHOPEDIC SURGERY | Facility: CLINIC | Age: 83
End: 2023-11-21
Payer: COMMERCIAL

## 2023-11-21 VITALS — BODY MASS INDEX: 35.32 KG/M2 | WEIGHT: 212 LBS | HEIGHT: 65 IN

## 2023-11-21 PROCEDURE — 20611 DRAIN/INJ JOINT/BURSA W/US: CPT | Mod: 50

## 2023-11-21 RX ORDER — HYALURONATE SODIUM 10 MG/ML
20 VIAL (ML) INTRAARTICULAR
Refills: 0 | Status: COMPLETED | OUTPATIENT
Start: 2023-11-21

## 2023-12-01 ENCOUNTER — APPOINTMENT (OUTPATIENT)
Dept: ORTHOPEDIC SURGERY | Facility: CLINIC | Age: 83
End: 2023-12-01

## 2023-12-12 ENCOUNTER — APPOINTMENT (OUTPATIENT)
Dept: ORTHOPEDIC SURGERY | Facility: CLINIC | Age: 83
End: 2023-12-12
Payer: COMMERCIAL

## 2023-12-12 VITALS — HEIGHT: 65 IN | BODY MASS INDEX: 35.32 KG/M2 | WEIGHT: 212 LBS

## 2023-12-12 PROCEDURE — 20611 DRAIN/INJ JOINT/BURSA W/US: CPT | Mod: 50

## 2023-12-12 RX ORDER — HYALURONATE SODIUM 10 MG/ML
20 VIAL (ML) INTRAARTICULAR
Refills: 0 | Status: COMPLETED | OUTPATIENT
Start: 2023-12-12

## 2023-12-19 ENCOUNTER — APPOINTMENT (OUTPATIENT)
Dept: ORTHOPEDIC SURGERY | Facility: CLINIC | Age: 83
End: 2023-12-19
Payer: COMMERCIAL

## 2023-12-19 VITALS — BODY MASS INDEX: 35.32 KG/M2 | WEIGHT: 212 LBS | HEIGHT: 65 IN

## 2023-12-19 PROCEDURE — 20611 DRAIN/INJ JOINT/BURSA W/US: CPT | Mod: 50

## 2023-12-19 RX ORDER — HYALURONATE SODIUM 10 MG/ML
20 VIAL (ML) INTRAARTICULAR
Refills: 0 | Status: COMPLETED | OUTPATIENT
Start: 2023-12-19

## 2023-12-19 NOTE — PHYSICAL EXAM
[Bilateral] : knee bilaterally [NL (0)] : extension 0 degrees [] : uses walker [de-identified] : PVD, mild pitting edema both legs [TWNoteComboBox7] : flexion 120 degrees

## 2023-12-19 NOTE — HISTORY OF PRESENT ILLNESS
[Gradual] : gradual [4] : 4 [Intermittent] : intermittent [Leisure] : leisure [Rest] : rest [Walking] : walking [Stairs] : stairs [Retired] : Work status: retired [3] : 3 [Hyalgan] : Hyalgan [] : Post Surgical Visit: no [FreeTextEntry1] : B Knees  [de-identified] : 12/12/2023

## 2023-12-19 NOTE — PROCEDURE
[Large Joint Injection] : Large joint injection [Bilateral] : bilaterally of the [Knee] : knee [Pain] : pain [X-ray evidence of Osteoarthritis on this or prior visit] : x-ray evidence of Osteoarthritis on this or prior visit [Betadine] : betadine [Ethyl Chloride sprayed topically] : ethyl chloride sprayed topically [Sterile technique used] : sterile technique used [Hyalgan (20mg)] : 20mg of Hyalgan [#3] : series #3 [] : Patient tolerated procedure well [Apply ice for 15min out of every hour for the next 12-24 hours as tolerated] : apply ice for 15 minutes out of every hour for the next 12-24 hours as tolerated [Patient was advised to rest the joint(s) for ____ days] : patient was advised to rest the joint(s) for [unfilled] days [Risks, benefits, alternatives discussed / Verbal consent obtained] : the risks benefits, and alternatives have been discussed, and verbal consent was obtained [Prior failure or difficult injection] : prior failure or difficult injection [All ultrasound images have been permanently captured and stored accordingly in our picture archiving and communication system] : All ultrasound images have been permanently captured and stored accordingly in our picture archiving and communication system [Visualization of the needle and placement of injection was performed without complication] : visualization of the needle and placement of injection was performed without complication

## 2023-12-27 ENCOUNTER — APPOINTMENT (OUTPATIENT)
Dept: ORTHOPEDIC SURGERY | Facility: CLINIC | Age: 83
End: 2023-12-27
Payer: COMMERCIAL

## 2023-12-27 VITALS — HEIGHT: 65 IN | BODY MASS INDEX: 35.32 KG/M2 | WEIGHT: 212 LBS

## 2023-12-27 DIAGNOSIS — M25.552 PAIN IN LEFT HIP: ICD-10-CM

## 2023-12-27 PROCEDURE — 20611 DRAIN/INJ JOINT/BURSA W/US: CPT | Mod: 50

## 2023-12-27 RX ORDER — HYALURONATE SODIUM 10 MG/ML
20 VIAL (ML) INTRAARTICULAR
Refills: 0 | Status: COMPLETED | OUTPATIENT
Start: 2023-12-27

## 2023-12-27 NOTE — PROCEDURE
[Large Joint Injection] : Large joint injection [Bilateral] : bilaterally of the [Knee] : knee [X-ray evidence of Osteoarthritis on this or prior visit] : x-ray evidence of Osteoarthritis on this or prior visit [Repeat series performed] : repeat series performed [Betadine] : betadine [Ethyl Chloride sprayed topically] : ethyl chloride sprayed topically [Sterile technique used] : sterile technique used [Hyalgan (20mg)] : 20mg of Hyalgan [#4] : series #4 [] : Patient tolerated procedure well [Apply ice for 15min out of every hour for the next 12-24 hours as tolerated] : apply ice for 15 minutes out of every hour for the next 12-24 hours as tolerated [Patient was advised to rest the joint(s) for ____ days] : patient was advised to rest the joint(s) for [unfilled] days [Risks, benefits, alternatives discussed / Verbal consent obtained] : the risks benefits, and alternatives have been discussed, and verbal consent was obtained [Prior failure or difficult injection] : prior failure or difficult injection [All ultrasound images have been permanently captured and stored accordingly in our picture archiving and communication system] : All ultrasound images have been permanently captured and stored accordingly in our picture archiving and communication system [Visualization of the needle and placement of injection was performed without complication] : visualization of the needle and placement of injection was performed without complication

## 2023-12-27 NOTE — PHYSICAL EXAM
[Bilateral] : knee bilaterally [NL (0)] : extension 0 degrees [] : uses walker [de-identified] : PVD, mild pitting edema both legs [TWNoteComboBox7] : flexion 120 degrees

## 2023-12-27 NOTE — HISTORY OF PRESENT ILLNESS
[Gradual] : gradual [Dull/Aching] : dull/aching [Intermittent] : intermittent [Leisure] : leisure [Rest] : rest [Walking] : walking [Stairs] : stairs [Retired] : Work status: retired [4] : 4 [Hyalgan] : Hyalgan [] : Post Surgical Visit: no [FreeTextEntry1] : B Knees  [de-identified] : 12/19/2023

## 2024-01-03 ENCOUNTER — APPOINTMENT (OUTPATIENT)
Dept: ORTHOPEDIC SURGERY | Facility: CLINIC | Age: 84
End: 2024-01-03
Payer: COMMERCIAL

## 2024-01-03 DIAGNOSIS — M17.11 UNILATERAL PRIMARY OSTEOARTHRITIS, RIGHT KNEE: ICD-10-CM

## 2024-01-03 DIAGNOSIS — M17.12 UNILATERAL PRIMARY OSTEOARTHRITIS, LEFT KNEE: ICD-10-CM

## 2024-01-03 PROCEDURE — 20611 DRAIN/INJ JOINT/BURSA W/US: CPT | Mod: 50

## 2024-01-03 RX ORDER — HYALURONATE SODIUM 10 MG/ML
20 VIAL (ML) INTRAARTICULAR
Refills: 0 | Status: COMPLETED | OUTPATIENT
Start: 2024-01-03

## 2024-01-03 NOTE — HISTORY OF PRESENT ILLNESS
[Gradual] : gradual [3] : 3 [Dull/Aching] : dull/aching [Intermittent] : intermittent [Rest] : rest [Hyalgan] : Hyalgan [] : Post Surgical Visit: no [de-identified] : 12/27/23

## 2024-01-03 NOTE — PROCEDURE
[Large Joint Injection] : Large joint injection [Bilateral] : bilaterally of the [Knee] : knee [Pain] : pain [X-ray evidence of Osteoarthritis on this or prior visit] : x-ray evidence of Osteoarthritis on this or prior visit [Repeat series performed] : repeat series performed [Betadine] : betadine [Ethyl Chloride sprayed topically] : ethyl chloride sprayed topically [Sterile technique used] : sterile technique used [Hyalgan (20mg)] : 20mg of Hyalgan [#5] : series #5 [] : Patient tolerated procedure well [Apply ice for 15min out of every hour for the next 12-24 hours as tolerated] : apply ice for 15 minutes out of every hour for the next 12-24 hours as tolerated [Patient was advised to rest the joint(s) for ____ days] : patient was advised to rest the joint(s) for [unfilled] days [Risks, benefits, alternatives discussed / Verbal consent obtained] : the risks benefits, and alternatives have been discussed, and verbal consent was obtained [Prior failure or difficult injection] : prior failure or difficult injection [All ultrasound images have been permanently captured and stored accordingly in our picture archiving and communication system] : All ultrasound images have been permanently captured and stored accordingly in our picture archiving and communication system [Visualization of the needle and placement of injection was performed without complication] : visualization of the needle and placement of injection was performed without complication

## 2024-01-03 NOTE — PHYSICAL EXAM
[Bilateral] : knee bilaterally [NL (0)] : extension 0 degrees [] : uses walker [de-identified] : PVD, mild pitting edema both legs [TWNoteComboBox7] : flexion 120 degrees

## 2024-04-01 ENCOUNTER — APPOINTMENT (OUTPATIENT)
Dept: ORTHOPEDIC SURGERY | Facility: CLINIC | Age: 84
End: 2024-04-01
Payer: COMMERCIAL

## 2024-04-01 VITALS — HEIGHT: 65 IN | WEIGHT: 212 LBS | BODY MASS INDEX: 35.32 KG/M2

## 2024-04-01 PROCEDURE — 72100 X-RAY EXAM L-S SPINE 2/3 VWS: CPT

## 2024-04-01 PROCEDURE — 72170 X-RAY EXAM OF PELVIS: CPT

## 2024-04-01 PROCEDURE — 99213 OFFICE O/P EST LOW 20 MIN: CPT

## 2024-04-01 NOTE — PHYSICAL EXAM
[] : midline lumbar tenderness [Facet arthropathy] : Facet arthropathy [Straightening consistent with spasm] : Straightening consistent with spasm [Disc space narrowing] : Disc space narrowing [AP] : anteroposterior [There are no fractures, subluxations or dislocations. No significant abnormalities are seen] : There are no fractures, subluxations or dislocations. No significant abnormalities are seen [Moderate arthritis (Tonnis Grade 2)] : Moderate arthritis (Tonnis Grade 2) [FreeTextEntry9] : sitting on the scooter, limite exam [FreeTextEntry1] : chronic compression fractures L3, L4.

## 2024-04-01 NOTE — HISTORY OF PRESENT ILLNESS
[Gradual] : gradual [Lower back] : lower back [8] : 8 [Sharp] : sharp [Constant] : constant [Leisure] : leisure [Rest] : rest [Sleep] : sleep [Standing] : standing [Walking] : walking [Retired] : Work status: retired [] : no [FreeTextEntry7] : B Legs  [de-identified] : 10/18/2022 [de-identified] : Dr. Angelo

## 2024-04-01 NOTE — REASON FOR VISIT
[Family Member] : family member [FreeTextEntry2] : Patient complains of continued sharp Lower Back pain. She complains of leg cramping which wakes her. Is now using a motorized scooter to get around.

## 2024-04-01 NOTE — ASSESSMENT
[FreeTextEntry1] : has defibrillator, so will get CT scan. PT prescribed. May need to refer to PM, await CT results.

## 2024-04-08 ENCOUNTER — RESULT REVIEW (OUTPATIENT)
Age: 84
End: 2024-04-08

## 2024-04-14 ENCOUNTER — NON-APPOINTMENT (OUTPATIENT)
Age: 84
End: 2024-04-14

## 2024-04-26 ENCOUNTER — APPOINTMENT (OUTPATIENT)
Dept: ORTHOPEDIC SURGERY | Facility: CLINIC | Age: 84
End: 2024-04-26
Payer: COMMERCIAL

## 2024-04-26 VITALS — HEIGHT: 65 IN | WEIGHT: 212 LBS | BODY MASS INDEX: 35.32 KG/M2

## 2024-04-26 DIAGNOSIS — M48.061 SPINAL STENOSIS, LUMBAR REGION WITHOUT NEUROGENIC CLAUDICATION: ICD-10-CM

## 2024-04-26 DIAGNOSIS — M54.16 RADICULOPATHY, LUMBAR REGION: ICD-10-CM

## 2024-04-26 DIAGNOSIS — S32.030S WEDGE COMPRESSION FRACTURE OF THIRD LUMBAR VERTEBRA, SEQUELA: ICD-10-CM

## 2024-04-26 PROCEDURE — 99214 OFFICE O/P EST MOD 30 MIN: CPT

## 2024-04-26 NOTE — ASSESSMENT
[FreeTextEntry1] : 84 F with LBP and neurogenic claudication.  Pain more likely from stenosis given improvement with forward flexion MRI L spine Likely referral to pain management No NSAIDS to A/c

## 2024-04-26 NOTE — IMAGING
[de-identified] : Spine: Inspection/Palpation: No tenderness to palpation throughout Cervical/thoracic/lumbar spine. No bony stepoffs, No lesions.  Gait: antalgic,   e: Flexion to 90 degrees, Extension to 30 degrees, rotation 30 degrees bilaterally, lateral flexion to 30 degrees bilaterally.  Neurologic:  Bilateral Lower Extremities 5/5 Iliopsoas/Quadriceps/Hamstrings/ Tibialis Anterior/ Gastrocnemius. Extensor Hallucis Longus/ Flexor Hallucis Longus except    Sensation intact to light touch L2-S1  Patellar/ Achilles reflex within normal limits.  CT L spine Severe biconcaved compression fracture of L3 and moderate compression deformity of the superior L4 endplate are age-indeterminate on CT however are new since the previous examination. MRI of the lumbar spine with better assess age of the fractures if clinically warranted.  Prominent degenerative disc disease and facet arthrosis throughout the lumbar spine.  At L2-L3 there is moderate to severe central canal and moderate foraminal stenosis. Findings are more pronounced than on the prior exam. ----- Page Break ----- At L3-L4 there is severe central canal and moderate to severe foraminal stenosis. Findings are more pronounced than on the prior study.  At L4-L5 there is moderate foraminal stenosis.

## 2024-04-26 NOTE — HISTORY OF PRESENT ILLNESS
[Lower back] : lower back [Dull/Aching] : dull/aching [Radiating] : radiating [Sharp] : sharp [de-identified] : 04/26/2024: Patient presents today for a new consult visit for the L-spine. Patient states that she has been having pain for a couple years. Was seeing Dr. Angelo who did xrays/mri. Patient is ambulating in electric wheelchair.  Has possible VCF unsure of acuity. recent CT scna. Pain worst in back and goes down legs when standing or walking.  Can only walk for a short distance before needs to sit down. Pain improved when leaning forward.  Has MRI Conditional AICD  [] : Post Surgical Visit: no

## 2024-05-06 ENCOUNTER — APPOINTMENT (OUTPATIENT)
Dept: ORTHOPEDIC SURGERY | Facility: CLINIC | Age: 84
End: 2024-05-06

## 2024-06-14 ENCOUNTER — APPOINTMENT (OUTPATIENT)
Dept: ORTHOPEDIC SURGERY | Facility: CLINIC | Age: 84
End: 2024-06-14
Payer: COMMERCIAL

## 2024-06-14 VITALS — WEIGHT: 212 LBS | HEIGHT: 65 IN | BODY MASS INDEX: 35.32 KG/M2

## 2024-06-14 DIAGNOSIS — S32.040S WEDGE COMPRESSION FRACTURE OF FOURTH LUMBAR VERTEBRA, SEQUELA: ICD-10-CM

## 2024-06-14 PROCEDURE — 99213 OFFICE O/P EST LOW 20 MIN: CPT

## 2024-06-14 NOTE — DATA REVIEWED
[MRI] : MRI [Lumbar Spine] : lumbar spine [I independently reviewed and interpreted images and report] : I independently reviewed and interpreted images and report [FreeTextEntry1] : acute L4 and subacute L3 VCF

## 2024-06-14 NOTE — IMAGING
[de-identified] : Spine: Inspection/Palpation: No tenderness to palpation throughout Cervical/thoracic/lumbar spine. No bony stepoffs, No lesions.  Gait: antalgic,   e: Flexion to 90 degrees, Extension to 30 degrees, rotation 30 degrees bilaterally, lateral flexion to 30 degrees bilaterally.  Neurologic:  Bilateral Lower Extremities 5/5 Iliopsoas/Quadriceps/Hamstrings/ Tibialis Anterior/ Gastrocnemius. Extensor Hallucis Longus/ Flexor Hallucis Longus except    Sensation intact to light touch L2-S1  Patellar/ Achilles reflex within normal limits.  CT L spine Severe biconcaved compression fracture of L3 and moderate compression deformity of the superior L4 endplate are age-indeterminate on CT however are new since the previous examination. MRI of the lumbar spine with better assess age of the fractures if clinically warranted.  Prominent degenerative disc disease and facet arthrosis throughout the lumbar spine.  At L2-L3 there is moderate to severe central canal and moderate foraminal stenosis. Findings are more pronounced than on the prior exam. ----- Page Break ----- At L3-L4 there is severe central canal and moderate to severe foraminal stenosis. Findings are more pronounced than on the prior study.  At L4-L5 there is moderate foraminal stenosis.

## 2024-06-14 NOTE — ASSESSMENT
[FreeTextEntry1] : 84 F with LBP and neurogenic claudication.  Pain more likely from stenosis given improvement with forward flexion subacute L3 and acute L4 VCF LSO Consider pain management given her pain seems to improve with forward bending so less likely from her fractures .

## 2024-06-14 NOTE — HISTORY OF PRESENT ILLNESS
[Lower back] : lower back [Dull/Aching] : dull/aching [Radiating] : radiating [Sharp] : sharp [Retired] : Work status: retired [de-identified] : 6/14/24 Pain has improved but persist.  Pain worsens when sitting. Improved when leaning forward   04/26/2024: Patient presents today for a new consult visit for the L-spine. Patient states that she has been having pain for a couple years. Was seeing Dr. Angelo who did xrays/mri. Patient is ambulating in electric wheelchair.  Has possible VCF unsure of acuity. recent CT scna. Pain worst in back and goes down legs when standing or walking.  Can only walk for a short distance before needs to sit down. Pain improved when leaning forward.  Has MRI Conditional AICD  [] : Post Surgical Visit: no [de-identified] : 4/26/2024 [de-identified] : Dr. Hodge [de-identified] : MRI

## 2024-07-01 ENCOUNTER — NON-APPOINTMENT (OUTPATIENT)
Age: 84
End: 2024-07-01

## 2024-08-23 ENCOUNTER — APPOINTMENT (OUTPATIENT)
Dept: ORTHOPEDIC SURGERY | Facility: CLINIC | Age: 84
End: 2024-08-23

## 2025-07-09 RX ORDER — HYALURONATE SODIUM 10 MG/ML
20 VIAL (ML) INTRAARTICULAR
Qty: 10 | Refills: 0 | Status: ACTIVE | COMMUNITY
Start: 2025-07-09 | End: 1900-01-01

## 2025-07-21 ENCOUNTER — APPOINTMENT (OUTPATIENT)
Dept: ORTHOPEDIC SURGERY | Facility: CLINIC | Age: 85
End: 2025-07-21
Payer: COMMERCIAL

## 2025-07-21 PROCEDURE — 73562 X-RAY EXAM OF KNEE 3: CPT | Mod: 50

## 2025-07-21 PROCEDURE — 99213 OFFICE O/P EST LOW 20 MIN: CPT | Mod: 25

## 2025-07-21 PROCEDURE — 20611 DRAIN/INJ JOINT/BURSA W/US: CPT | Mod: 50

## 2025-07-21 RX ORDER — HYALURONATE SODIUM 10 MG/ML
20 VIAL (ML) INTRAARTICULAR
Refills: 0 | Status: COMPLETED | OUTPATIENT
Start: 2025-07-21

## 2025-07-29 ENCOUNTER — APPOINTMENT (OUTPATIENT)
Dept: ORTHOPEDIC SURGERY | Facility: CLINIC | Age: 85
End: 2025-07-29
Payer: COMMERCIAL

## 2025-07-29 VITALS — BODY MASS INDEX: 35.32 KG/M2 | HEIGHT: 65 IN | WEIGHT: 212 LBS

## 2025-07-29 VITALS — HEIGHT: 65 IN | BODY MASS INDEX: 35.32 KG/M2 | WEIGHT: 212 LBS

## 2025-07-29 PROCEDURE — 20611 DRAIN/INJ JOINT/BURSA W/US: CPT | Mod: 50

## 2025-07-29 RX ORDER — HYALURONATE SODIUM 10 MG/ML
20 VIAL (ML) INTRAARTICULAR
Refills: 0 | Status: COMPLETED | OUTPATIENT
Start: 2025-07-29

## 2025-08-04 ENCOUNTER — APPOINTMENT (OUTPATIENT)
Dept: ORTHOPEDIC SURGERY | Facility: CLINIC | Age: 85
End: 2025-08-04
Payer: COMMERCIAL

## 2025-08-04 VITALS — HEIGHT: 65 IN | BODY MASS INDEX: 35.32 KG/M2 | WEIGHT: 212 LBS

## 2025-08-04 PROCEDURE — 20611 DRAIN/INJ JOINT/BURSA W/US: CPT | Mod: 50

## 2025-08-04 RX ORDER — HYALURONATE SODIUM 10 MG/ML
20 VIAL (ML) INTRAARTICULAR
Refills: 0 | Status: COMPLETED | OUTPATIENT
Start: 2025-08-04

## 2025-08-12 ENCOUNTER — APPOINTMENT (OUTPATIENT)
Dept: ORTHOPEDIC SURGERY | Facility: CLINIC | Age: 85
End: 2025-08-12
Payer: COMMERCIAL

## 2025-08-12 PROCEDURE — 20611 DRAIN/INJ JOINT/BURSA W/US: CPT | Mod: 50

## 2025-08-12 RX ORDER — HYALURONATE SODIUM 10 MG/ML
20 VIAL (ML) INTRAARTICULAR
Refills: 0 | Status: COMPLETED | OUTPATIENT
Start: 2025-08-12

## 2025-08-19 ENCOUNTER — APPOINTMENT (OUTPATIENT)
Dept: ORTHOPEDIC SURGERY | Facility: CLINIC | Age: 85
End: 2025-08-19
Payer: COMMERCIAL

## 2025-08-19 VITALS — HEIGHT: 65 IN | WEIGHT: 212 LBS | BODY MASS INDEX: 35.32 KG/M2

## 2025-08-19 DIAGNOSIS — M17.12 UNILATERAL PRIMARY OSTEOARTHRITIS, LEFT KNEE: ICD-10-CM

## 2025-08-19 DIAGNOSIS — M17.11 UNILATERAL PRIMARY OSTEOARTHRITIS, RIGHT KNEE: ICD-10-CM

## 2025-08-19 PROCEDURE — 20611 DRAIN/INJ JOINT/BURSA W/US: CPT | Mod: 50

## 2025-08-19 RX ORDER — HYALURONATE SODIUM 10 MG/ML
20 VIAL (ML) INTRAARTICULAR
Refills: 0 | Status: COMPLETED | OUTPATIENT
Start: 2025-08-19